# Patient Record
Sex: FEMALE | Race: WHITE | NOT HISPANIC OR LATINO | Employment: UNEMPLOYED | URBAN - METROPOLITAN AREA
[De-identification: names, ages, dates, MRNs, and addresses within clinical notes are randomized per-mention and may not be internally consistent; named-entity substitution may affect disease eponyms.]

---

## 2017-05-30 DIAGNOSIS — Z00.00 ANNUAL PHYSICAL EXAM: Primary | ICD-10-CM

## 2017-08-08 DIAGNOSIS — M79.643 PAIN OF HAND, UNSPECIFIED LATERALITY: Primary | ICD-10-CM

## 2017-08-09 ENCOUNTER — OFFICE VISIT (OUTPATIENT)
Dept: ORTHOPEDICS | Facility: CLINIC | Age: 57
End: 2017-08-09
Payer: COMMERCIAL

## 2017-08-09 ENCOUNTER — HOSPITAL ENCOUNTER (OUTPATIENT)
Dept: RADIOLOGY | Facility: HOSPITAL | Age: 57
Discharge: HOME | End: 2017-08-09
Attending: ORTHOPAEDIC SURGERY
Payer: COMMERCIAL

## 2017-08-09 ENCOUNTER — HOSPITAL ENCOUNTER (OUTPATIENT)
Dept: RADIOLOGY | Facility: HOSPITAL | Age: 57
Discharge: HOME | End: 2017-08-09
Attending: FAMILY MEDICINE
Payer: COMMERCIAL

## 2017-08-09 ENCOUNTER — OFFICE VISIT (OUTPATIENT)
Dept: FAMILY MEDICINE | Facility: CLINIC | Age: 57
End: 2017-08-09
Payer: COMMERCIAL

## 2017-08-09 ENCOUNTER — CLINICAL SUPPORT (OUTPATIENT)
Dept: INTERNAL MEDICINE | Facility: CLINIC | Age: 57
End: 2017-08-09
Payer: COMMERCIAL

## 2017-08-09 VITALS
SYSTOLIC BLOOD PRESSURE: 168 MMHG | HEART RATE: 71 BPM | DIASTOLIC BLOOD PRESSURE: 92 MMHG | WEIGHT: 110 LBS | HEIGHT: 60 IN | BODY MASS INDEX: 21.6 KG/M2

## 2017-08-09 VITALS
TEMPERATURE: 98 F | BODY MASS INDEX: 21.48 KG/M2 | HEART RATE: 76 BPM | DIASTOLIC BLOOD PRESSURE: 80 MMHG | SYSTOLIC BLOOD PRESSURE: 142 MMHG | WEIGHT: 110 LBS

## 2017-08-09 DIAGNOSIS — M65.331 TRIGGER FINGER, RIGHT MIDDLE FINGER: Primary | ICD-10-CM

## 2017-08-09 DIAGNOSIS — Z00.00 PREVENTATIVE HEALTH CARE: Primary | ICD-10-CM

## 2017-08-09 DIAGNOSIS — Z12.11 COLON CANCER SCREENING: ICD-10-CM

## 2017-08-09 DIAGNOSIS — M79.643 PAIN OF HAND, UNSPECIFIED LATERALITY: ICD-10-CM

## 2017-08-09 DIAGNOSIS — Z00.00 ANNUAL PHYSICAL EXAM: ICD-10-CM

## 2017-08-09 DIAGNOSIS — Z12.31 VISIT FOR SCREENING MAMMOGRAM: ICD-10-CM

## 2017-08-09 DIAGNOSIS — Z00.00 ANNUAL PHYSICAL EXAM: Primary | ICD-10-CM

## 2017-08-09 LAB
ALBUMIN SERPL BCP-MCNC: 3.9 G/DL
ALP SERPL-CCNC: 73 U/L
ALT SERPL W/O P-5'-P-CCNC: 12 U/L
ANION GAP SERPL CALC-SCNC: 17 MMOL/L
AST SERPL-CCNC: 16 U/L
BILIRUB SERPL-MCNC: 0.5 MG/DL
BUN SERPL-MCNC: 9 MG/DL
CALCIUM SERPL-MCNC: 9.3 MG/DL
CHLORIDE SERPL-SCNC: 107 MMOL/L
CHOLEST/HDLC SERPL: 3.1 {RATIO}
CO2 SERPL-SCNC: 26 MMOL/L
CREAT SERPL-MCNC: 0.7 MG/DL
ERYTHROCYTE [DISTWIDTH] IN BLOOD BY AUTOMATED COUNT: 12.6 %
EST. GFR  (AFRICAN AMERICAN): >60 ML/MIN/1.73 M^2
EST. GFR  (NON AFRICAN AMERICAN): >60 ML/MIN/1.73 M^2
GLUCOSE SERPL-MCNC: 102 MG/DL
HCT VFR BLD AUTO: 37.6 %
HDL/CHOLESTEROL RATIO: 32.4 %
HDLC SERPL-MCNC: 222 MG/DL
HDLC SERPL-MCNC: 72 MG/DL
HGB BLD-MCNC: 12.9 G/DL
LDLC SERPL CALC-MCNC: 136.4 MG/DL
MCH RBC QN AUTO: 30.4 PG
MCHC RBC AUTO-ENTMCNC: 34.3 G/DL
MCV RBC AUTO: 89 FL
NONHDLC SERPL-MCNC: 150 MG/DL
PLATELET # BLD AUTO: 328 K/UL
PMV BLD AUTO: 10.2 FL
POTASSIUM SERPL-SCNC: 4.2 MMOL/L
PROT SERPL-MCNC: 6.9 G/DL
RBC # BLD AUTO: 4.24 M/UL
SODIUM SERPL-SCNC: 150 MMOL/L
TRIGL SERPL-MCNC: 68 MG/DL
TSH SERPL DL<=0.005 MIU/L-ACNC: 1.7 UIU/ML
WBC # BLD AUTO: 6.16 K/UL

## 2017-08-09 PROCEDURE — 3078F DIAST BP <80 MM HG: CPT | Mod: S$GLB,,, | Performed by: ORTHOPAEDIC SURGERY

## 2017-08-09 PROCEDURE — 85027 COMPLETE CBC AUTOMATED: CPT | Mod: PO

## 2017-08-09 PROCEDURE — 20550 NJX 1 TENDON SHEATH/LIGAMENT: CPT | Mod: F7,S$GLB,, | Performed by: ORTHOPAEDIC SURGERY

## 2017-08-09 PROCEDURE — 80053 COMPREHEN METABOLIC PANEL: CPT | Mod: PO

## 2017-08-09 PROCEDURE — 3077F SYST BP >= 140 MM HG: CPT | Mod: S$GLB,,, | Performed by: ORTHOPAEDIC SURGERY

## 2017-08-09 PROCEDURE — 99213 OFFICE O/P EST LOW 20 MIN: CPT | Mod: 25,S$GLB,, | Performed by: ORTHOPAEDIC SURGERY

## 2017-08-09 PROCEDURE — 3008F BODY MASS INDEX DOCD: CPT | Mod: S$GLB,,, | Performed by: ORTHOPAEDIC SURGERY

## 2017-08-09 PROCEDURE — 80061 LIPID PANEL: CPT

## 2017-08-09 PROCEDURE — 73130 X-RAY EXAM OF HAND: CPT | Mod: TC,PO,RT

## 2017-08-09 PROCEDURE — 83036 HEMOGLOBIN GLYCOSYLATED A1C: CPT

## 2017-08-09 PROCEDURE — 77067 SCR MAMMO BI INCL CAD: CPT | Mod: TC

## 2017-08-09 PROCEDURE — 99396 PREV VISIT EST AGE 40-64: CPT | Mod: S$GLB,,, | Performed by: FAMILY MEDICINE

## 2017-08-09 PROCEDURE — 77063 BREAST TOMOSYNTHESIS BI: CPT | Mod: 26,,, | Performed by: RADIOLOGY

## 2017-08-09 PROCEDURE — 73130 X-RAY EXAM OF HAND: CPT | Mod: 26,RT,, | Performed by: RADIOLOGY

## 2017-08-09 PROCEDURE — 90715 TDAP VACCINE 7 YRS/> IM: CPT | Mod: S$GLB,,, | Performed by: FAMILY MEDICINE

## 2017-08-09 PROCEDURE — 84443 ASSAY THYROID STIM HORMONE: CPT

## 2017-08-09 PROCEDURE — 99999 PR PBB SHADOW E&M-EST. PATIENT-LVL III: CPT | Mod: PBBFAC,,, | Performed by: FAMILY MEDICINE

## 2017-08-09 PROCEDURE — 99999 PR PBB SHADOW E&M-EST. PATIENT-LVL III: CPT | Mod: PBBFAC,,, | Performed by: ORTHOPAEDIC SURGERY

## 2017-08-09 PROCEDURE — 77067 SCR MAMMO BI INCL CAD: CPT | Mod: 26,,, | Performed by: RADIOLOGY

## 2017-08-09 RX ORDER — TRIAMCINOLONE ACETONIDE 40 MG/ML
40 INJECTION, SUSPENSION INTRA-ARTICULAR; INTRAMUSCULAR
Status: DISCONTINUED | OUTPATIENT
Start: 2017-08-09 | End: 2017-08-09 | Stop reason: HOSPADM

## 2017-08-09 RX ADMIN — TRIAMCINOLONE ACETONIDE 40 MG: 40 INJECTION, SUSPENSION INTRA-ARTICULAR; INTRAMUSCULAR at 10:08

## 2017-08-09 NOTE — PROCEDURES
Tendon Sheath  Date/Time: 8/9/2017 10:30 AM  Performed by: TONNY GONZALEZ  Authorized by: TONNY GONZALEZ     Consent Done?: Yes (Verbal)  Timeout: prior to procedure the correct patient, procedure, and site was verified   Indications:  Pain  Site marked: the procedure site was marked    Timeout: prior to procedure the correct patient, procedure, and site was verified    Location:  Long finger  Site:  R long MCP  Prep: patient was prepped and draped in usual sterile fashion    Ultrasonic guidance for needle placement?: No    Needle size:  22 G  Approach:  Volar  Medications:  40 mg triamcinolone acetonide 40 mg/mL  Patient tolerance:  Patient tolerated the procedure well with no immediate complications

## 2017-08-09 NOTE — PROGRESS NOTES
Past Medical History:   Diagnosis Date    Arthritis     Breast cyst 2000    Fibrocystic breast 2000    Hypertension     MVP (mitral valve prolapse)     during pregnancy       Past Surgical History:   Procedure Laterality Date    BREAST CYST ASPIRATION         No current outpatient prescriptions on file.     No current facility-administered medications for this visit.        Allergies   Allergen Reactions    Aspirin Nausea And Vomiting       Family History   Problem Relation Age of Onset    Hypertension Mother     Cancer Father 59     prostate    Hypertension Sister     Hypertension Daughter     Heart disease Paternal Uncle 52     mi    Diabetes Paternal Grandmother     Ovarian cancer Maternal Aunt 80    Breast cancer Maternal Aunt        Social History     Social History    Marital status:      Spouse name: N/A    Number of children: 3    Years of education: N/A     Occupational History    retired teacher      Social History Main Topics    Smoking status: Never Smoker    Smokeless tobacco: Never Used    Alcohol use Not on file      Comment: 2-3 glasses of wine per week    Drug use: Unknown    Sexual activity: Not on file     Other Topics Concern    Not on file     Social History Narrative    Exercise: regular walking       Chief Complaint:   Chief Complaint   Patient presents with    Hand Pain     right hand trigger finger        History: Is a 57-year-old right-hand-dominant female seen for right middle finger pain.  Patient states it started back in December.  Middle finger starts to get stuck in trigger at times.  Never had a history of this before.  Does have some achiness and stiffness in her hand with a history of some arthritis.  Symptoms overall are stable and moderate.  No prior treatment.    Present: The trigger finger started to return.  The cortisone injection back in September helped a lot.  Pain over the last 3-4 weeks.  Has some triggering and is painful after activity.   Pain of a 2 out of 10.  She has had 2 previous injections.      Review of Systems:    Constitution: Negative for chills, fever, and sweats.  Negative for unexplained weight loss.    HENT:  Negative for headaches and blurry vision.    Cardiovascular:Negative for chest pain or irregular heart beat. Negative for hypertension.    Respiratory:  Negative for cough and shortness of breath.    Gastrointestinal: Negative for abdominal pain, heartburn, melena, nausea, and vomitting.    Genitourinary:  Negative bladder incontinence and dysuria.    Musculoskeletal:  See HPI    Neurological: Negative for numbness.    Psychiatric/Behavioral: Negative for depression.  The patient is not nervous/anxious.      Endocrine: Negative for polyuria    Hematologic/Lymphatic: Negative for bleeding problem.  Does not bruise/bleed easily.    Skin: Negative for poor would healing and rash      Physical Examination:    Vital Signs:    Vitals:    08/09/17 0945   BP: (!) 168/92   Pulse: 71       Body mass index is 21.48 kg/m².    This a well-developed, well nourished patient in no acute distress.  They are alert and oriented and cooperative to examination.  Pt. walks without an antalgic gait.      Examination of the right hand and wrist shows no signs of rashes or erythema. Patient has no masses ecchymosis or effusions. Patient has full range of motion of the wrist in flexion and extension as well as ulnar and radial deviation. The patient also has full range of motion of all joints in the hand. There are 2+ radial pulse and intact light touch sensation in all 5 digits.  Tender over the A1 pulley of the long finger    X-rays: 3 views of the right hand are ordered and reviewed which show no significant arthritic changes or fractures.  No change from previous.     Assessment:: Right long trigger finger    Plan:  I reviewed the diagnosis of trigger finger with the patient today.  We discussed treatment options including observation versus cortisone  injection.  Patient would like to try cortisone injection today.  We also talked about trigger finger surgery.  She'll follow-up as needed.

## 2017-08-09 NOTE — PROGRESS NOTES
August 9, 2017                                                                                                                                                                                                                                                                                      Magui Lemus  [address] [facility] [place] [address]                                                                                                                                                                                                                                                                                                RE: Magui Lemus                                                        Clinic #:[id number]                                                                                                                                   Dear  Magui Lemus,                                                                                                                                           Thank you for allowing me to serve you and perform your Executive Health exam on August 9, 2017.   This letter will serve a brief summary of the history, physical findings, and laboratory/studies performed and recommendations at that time.                                                                                         REASON FOR VISIT: Executive Health Preventive Physical Examination    Past Medical History:   Diagnosis Date    Arthritis     Hypertension     MVP (mitral valve prolapse)     during pregnancy       History reviewed. No pertinent surgical history.    Family History   Problem Relation Age of Onset    Hypertension Mother     Cancer Father 59     prostate    Hypertension Sister     Hypertension Daughter     Heart disease Paternal Uncle 52     mi    Diabetes Paternal Grandmother     Ovarian cancer Maternal Aunt 80    Breast cancer Neg Hx        Social History     Social History    Marital  status:      Social History Main Topics    Smoking status: Never Smoker    Smokeless tobacco: Not on file    Alcohol use Not on file      Comment: 2-3 glasses of wine per week     Allergies: Aspirin      REVIEW OF SYSTEMS:  No recent changes in weight, or complaints of fatigue. No recent changes in vision, or hearing. Denies frequent headaches.No recent changes in voice. No new or changing skin lesions. Denies abnormal bruising or bleeding.  Denies chest pain or sensation of skipped beats. No new onset of shortness of breath, or dyspnea on exertion. Denies abdominal discomfort, constipation, diarrhea,or blood in stool. Denies difficulty with urination.   No recent joint swelling or muscle discomfort. Denies pain or weakness in extremeties. No recent loss of balance. Denies problems with sleep or depression.        Remainder of the review of systems without pertinent positves at this time.                                                                              PHYSICAL EXAM:   VITAL SIGNS: BP (!) 142/80   Pulse 76   Temp 98 °F (36.7 °C) (Oral)   Wt 49.9 kg (110 lb)   BMI 21.48 kg/m²   GENERAL APPEARANCE:  Well nourished and normally developed,  pleasant 57 y.o. female, in good spirits.  SKIN: Without rashes or overt lesions.  HEENT: Head normacephalic. There was no scleral icterus. Mucous membranes were moist. Dentition. Neck is supple, no thyromegally, or carotid bruits.  LUNGS: Clear to auscultation bilaterally. Normal respiratory effort.  HEART: Exam reveals regular rate and rhythm. First and second heart sounds normal. No murmurs, rubs or gallops.   ABDOMEN: Soft, non-tender, non-distended. Exam reveals normal bowl sounds, no masses, no organomegaly and no aortic enlargement.    EXTREMITIES:  Nonedematous, both femoral and pedal pulses are normal. No joint stiffness or tenderness. Full range of motion and strength, upper and lower bilaterally.    LAB DATA/STUDIES REVIEWED:  LABS:  reviewed  Mammogram: normal    ASSESSMENT/RECOMMENDATIONS :    At this time,  you appear to be in good medical condition.    tdap vaccine  colonoscopy  Continue to work on regular exercise, maintenance of a healthy weight, balanced diet rich in fruits/vegetables and lean protein, and avoidance of unhealthy habits like smoking and excessive alcohol intake.  I look forward to seeing you again next year.    Please contact me should you have any questions or concerns regarding physical findings, or my recommendations.       Sincerely yours,    Geronimo Gee M.D.  Department of Family Practice  [facility]

## 2017-08-10 LAB
ESTIMATED AVG GLUCOSE: 97 MG/DL
HBA1C MFR BLD HPLC: 5 %

## 2017-08-11 ENCOUNTER — LAB VISIT (OUTPATIENT)
Dept: LAB | Facility: HOSPITAL | Age: 57
End: 2017-08-11
Attending: FAMILY MEDICINE
Payer: COMMERCIAL

## 2017-08-11 DIAGNOSIS — Z11.59 NEED FOR HEPATITIS C SCREENING TEST: ICD-10-CM

## 2017-08-11 LAB — HCV AB SERPL QL IA: NEGATIVE

## 2017-08-11 PROCEDURE — 36415 COLL VENOUS BLD VENIPUNCTURE: CPT | Mod: PO

## 2017-08-11 PROCEDURE — 86803 HEPATITIS C AB TEST: CPT

## 2017-08-17 ENCOUNTER — TELEPHONE (OUTPATIENT)
Dept: GASTROENTEROLOGY | Facility: CLINIC | Age: 57
End: 2017-08-17

## 2017-08-17 NOTE — TELEPHONE ENCOUNTER
----- Message from Brenda Kelsey sent at 8/17/2017 10:43 AM CDT -----  Contact: self   Patient miss call from your office please call back at [phone] (mbdf)

## 2017-08-17 NOTE — TELEPHONE ENCOUNTER
Pt has been scheduled for colon on 9/5. Reviewed mg citrate prep. Pt is aware I will be mailing instructions and confirmation letter.

## 2017-09-01 ENCOUNTER — TELEPHONE (OUTPATIENT)
Dept: GASTROENTEROLOGY | Facility: CLINIC | Age: 57
End: 2017-09-01

## 2017-09-01 NOTE — TELEPHONE ENCOUNTER
----- Message from Brenda Kelsey sent at 9/1/2017  9:44 AM CDT -----  Contact: Self  Patient want to cancel colonoscopy procedure for September 5. Will call back to reschedule any questions call back at [phone] (home)

## 2017-09-13 ENCOUNTER — TELEPHONE (OUTPATIENT)
Dept: GASTROENTEROLOGY | Facility: CLINIC | Age: 57
End: 2017-09-13

## 2017-09-13 NOTE — TELEPHONE ENCOUNTER
----- Message from Jenna Nguyen sent at 9/13/2017  9:48 AM CDT -----  Please call pt at [phone]  Asking to reschedule her colonoscopy

## 2017-09-15 ENCOUNTER — ANESTHESIA EVENT (OUTPATIENT)
Dept: ENDOSCOPY | Facility: HOSPITAL | Age: 57
End: 2017-09-15
Payer: COMMERCIAL

## 2017-09-15 ENCOUNTER — ANESTHESIA (OUTPATIENT)
Dept: ENDOSCOPY | Facility: HOSPITAL | Age: 57
End: 2017-09-15
Payer: COMMERCIAL

## 2017-09-15 ENCOUNTER — HOSPITAL ENCOUNTER (OUTPATIENT)
Facility: HOSPITAL | Age: 57
Discharge: HOME | End: 2017-09-15
Attending: INTERNAL MEDICINE | Admitting: INTERNAL MEDICINE
Payer: COMMERCIAL

## 2017-09-15 ENCOUNTER — SURGERY (OUTPATIENT)
Age: 57
End: 2017-09-15

## 2017-09-15 VITALS
HEART RATE: 87 BPM | BODY MASS INDEX: 20.77 KG/M2 | DIASTOLIC BLOOD PRESSURE: 80 MMHG | OXYGEN SATURATION: 100 % | SYSTOLIC BLOOD PRESSURE: 154 MMHG | TEMPERATURE: 98 F | HEIGHT: 61 IN | RESPIRATION RATE: 16 BRPM | WEIGHT: 110 LBS

## 2017-09-15 VITALS — RESPIRATION RATE: 16 BRPM

## 2017-09-15 DIAGNOSIS — Z12.11 COLON CANCER SCREENING: ICD-10-CM

## 2017-09-15 PROCEDURE — 45381 COLONOSCOPY SUBMUCOUS NJX: CPT | Mod: 51,,, | Performed by: INTERNAL MEDICINE

## 2017-09-15 PROCEDURE — 45381 COLONOSCOPY SUBMUCOUS NJX: CPT | Mod: PO | Performed by: INTERNAL MEDICINE

## 2017-09-15 PROCEDURE — 88305 TISSUE EXAM BY PATHOLOGIST: CPT | Mod: 26,,, | Performed by: PATHOLOGY

## 2017-09-15 PROCEDURE — 37000009 HC ANESTHESIA EA ADD 15 MINS: Mod: PO | Performed by: INTERNAL MEDICINE

## 2017-09-15 PROCEDURE — D9220A PRA ANESTHESIA: Mod: CRNA,,,

## 2017-09-15 PROCEDURE — 63600175 PHARM REV CODE 636 W HCPCS: Mod: PO | Performed by: NURSE ANESTHETIST, CERTIFIED REGISTERED

## 2017-09-15 PROCEDURE — 45385 COLONOSCOPY W/LESION REMOVAL: CPT | Mod: PO | Performed by: INTERNAL MEDICINE

## 2017-09-15 PROCEDURE — 37000008 HC ANESTHESIA 1ST 15 MINUTES: Mod: PO | Performed by: INTERNAL MEDICINE

## 2017-09-15 PROCEDURE — 25000003 PHARM REV CODE 250: Mod: PO | Performed by: ANESTHESIOLOGY

## 2017-09-15 PROCEDURE — 27201089 HC SNARE, DISP (ANY): Mod: PO | Performed by: INTERNAL MEDICINE

## 2017-09-15 PROCEDURE — 88305 TISSUE EXAM BY PATHOLOGIST: CPT | Performed by: PATHOLOGY

## 2017-09-15 PROCEDURE — 25000003 PHARM REV CODE 250: Mod: PO | Performed by: INTERNAL MEDICINE

## 2017-09-15 PROCEDURE — 45385 COLONOSCOPY W/LESION REMOVAL: CPT | Mod: 33,,, | Performed by: INTERNAL MEDICINE

## 2017-09-15 PROCEDURE — D9220A PRA ANESTHESIA: Mod: ANES,,, | Performed by: ANESTHESIOLOGY

## 2017-09-15 PROCEDURE — 27201028 HC NEEDLE, SCLERO: Mod: PO | Performed by: INTERNAL MEDICINE

## 2017-09-15 RX ORDER — SODIUM CHLORIDE, SODIUM LACTATE, POTASSIUM CHLORIDE, CALCIUM CHLORIDE 600; 310; 30; 20 MG/100ML; MG/100ML; MG/100ML; MG/100ML
INJECTION, SOLUTION INTRAVENOUS CONTINUOUS
Status: DISCONTINUED | OUTPATIENT
Start: 2017-09-15 | End: 2017-09-15 | Stop reason: HOSPADM

## 2017-09-15 RX ORDER — LIDOCAINE HCL/PF 100 MG/5ML
SYRINGE (ML) INTRAVENOUS
Status: DISCONTINUED | OUTPATIENT
Start: 2017-09-15 | End: 2017-09-15

## 2017-09-15 RX ORDER — PROPOFOL 10 MG/ML
VIAL (ML) INTRAVENOUS
Status: DISCONTINUED | OUTPATIENT
Start: 2017-09-15 | End: 2017-09-15

## 2017-09-15 RX ORDER — LIDOCAINE HYDROCHLORIDE 10 MG/ML
1 INJECTION INFILTRATION; PERINEURAL ONCE
Status: COMPLETED | OUTPATIENT
Start: 2017-09-15 | End: 2017-09-15

## 2017-09-15 RX ADMIN — PROPOFOL 30 MG: 10 INJECTION, EMULSION INTRAVENOUS at 10:09

## 2017-09-15 RX ADMIN — PROPOFOL 50 MG: 10 INJECTION, EMULSION INTRAVENOUS at 10:09

## 2017-09-15 RX ADMIN — SODIUM CHLORIDE, SODIUM LACTATE, POTASSIUM CHLORIDE, AND CALCIUM CHLORIDE: 600; 310; 30; 20 INJECTION, SOLUTION INTRAVENOUS at 09:09

## 2017-09-15 RX ADMIN — PROPOFOL 100 MG: 10 INJECTION, EMULSION INTRAVENOUS at 10:09

## 2017-09-15 RX ADMIN — PROPOFOL 40 MG: 10 INJECTION, EMULSION INTRAVENOUS at 10:09

## 2017-09-15 RX ADMIN — LIDOCAINE HYDROCHLORIDE 1 ML: 10 INJECTION, SOLUTION EPIDURAL; INFILTRATION; INTRACAUDAL; PERINEURAL at 09:09

## 2017-09-15 RX ADMIN — LIDOCAINE HYDROCHLORIDE 75 MG: 20 INJECTION, SOLUTION INTRAVENOUS at 10:09

## 2017-09-15 NOTE — DISCHARGE SUMMARY
Discharge Note  Short Stay      SUMMARY     Admit Date: 9/15/2017    Attending Physician: Florentino Lawton MD     Discharge Physician: Florentino Lawton MD    Discharge Date: 9/15/2017 10:59 AM    Final Diagnosis: Screen for colon cancer [Z12.11]    Disposition: HOME OR SELF CARE    Patient Instructions:   There are no discharge medications for this patient.      Discharge Procedure Orders (must include Diet, Follow-up, Activity)    Follow Up:  Follow up with PCP as previously scheduled  Resume routine diet.  Activity as tolerated.    No driving day of procedure.

## 2017-09-15 NOTE — ANESTHESIA PREPROCEDURE EVALUATION
09/15/2017  Magui Lemus is a 57 y.o., female.    Anesthesia Evaluation    I have reviewed the Patient Summary Reports.    I have reviewed the Nursing Notes.   I have reviewed the Medications.     Review of Systems         Anesthesia Plan  Type of Anesthesia, risks & benefits discussed:  Anesthesia Type:  general  Patient's Preference:   Intra-op Monitoring Plan:   Intra-op Monitoring Plan Comments:   Post Op Pain Control Plan:   Post Op Pain Control Plan Comments:   Induction:   IV  Beta Blocker:  Patient is not currently on a Beta-Blocker (No further documentation required).       Informed Consent: Patient understands risks and agrees with Anesthesia plan.  Questions answered. Anesthesia consent signed with patient.  ASA Score: 1     Day of Surgery Review of History & Physical: I have interviewed and examined the patient. I have reviewed the patient's H&P dated:  There are no significant changes.          Ready For Surgery From Anesthesia Perspective.

## 2017-09-15 NOTE — TRANSFER OF CARE
"Anesthesia Transfer of Care Note    Patient: Magui Lemus    Procedure(s) Performed: Procedure(s) (LRB):  COLONOSCOPY (N/A)    Patient location: PACU    Anesthesia Type: general    Transport from OR: Transported from OR on room air with adequate spontaneous ventilation    Post pain: adequate analgesia    Post assessment: no apparent anesthetic complications and tolerated procedure well    Post vital signs: stable    Level of consciousness: awake    Nausea/Vomiting: no nausea/vomiting    Complications: none    Transfer of care protocol was followed      Last vitals:   Visit Vitals  BP (!) 161/91 (BP Location: Right arm, Patient Position: Lying)   Pulse 97   Temp 36.4 °C (97.5 °F) (Skin)   Resp 18   Ht 5' 0.5" (1.537 m)   Wt 49.9 kg (110 lb)   SpO2 99%   Breastfeeding? No   BMI 21.13 kg/m²     "

## 2017-09-15 NOTE — H&P
History & Physical - Short Stay  Gastroenterology      SUBJECTIVE:     Procedure: Colonoscopy    Chief Complaint/Indication for Procedure: Screening    No prescriptions prior to admission.       Review of patient's allergies indicates:   Allergen Reactions    Aspirin Nausea And Vomiting        Past Medical History:   Diagnosis Date    Arthritis     Breast cyst 2000    Fibrocystic breast 2000    Hypertension     resolved    MVP (mitral valve prolapse)     during pregnancy     Past Surgical History:   Procedure Laterality Date    BREAST CYST ASPIRATION Left      Family History   Problem Relation Age of Onset    Hypertension Mother     Cancer Father 59     prostate    Hypertension Sister     Hypertension Daughter     Heart disease Paternal Uncle 52     mi    Diabetes Paternal Grandmother     Ovarian cancer Maternal Aunt 80    Breast cancer Maternal Aunt      Social History   Substance Use Topics    Smoking status: Never Smoker    Smokeless tobacco: Never Used    Alcohol use Yes      Comment: 2-3 glasses of wine per week         OBJECTIVE:     Vital Signs (Most Recent)  Temp: 97.5 °F (36.4 °C) (09/15/17 0949)  Pulse: 97 (09/15/17 0949)  Resp: 18 (09/15/17 0949)  BP: (!) 161/91 (09/15/17 0949)  SpO2: 99 % (09/15/17 0949)    Physical Exam                                                        GENERAL:  Comfortable, in no acute distress.                                 HEENT EXAM:  Nonicteric.  No adenopathy.  Oropharynx is clear.               NECK:  Supple.                                                               LUNGS:  Clear.                                                               CARDIAC:  Regular rate and rhythm.  S1, S2.  No murmur.                      ABDOMEN:  Soft, positive bowel sounds, nontender.  No hepatosplenomegaly or masses.  No rebound or guarding.                                             EXTREMITIES:  No edema.     MENTAL STATUS:  Normal, alert and  oriented.      ASSESSMENT/PLAN:     Assessment: Colorectal cancer screening    Plan: Colonoscopy    Anesthesia Plan: General    ASA Grade: ASA 2 - Patient with mild systemic disease with no functional limitations    MALLAMPATI SCORE:  I (soft palate, uvula, fauces, and tonsillar pillars visible)

## 2017-09-15 NOTE — DISCHARGE INSTRUCTIONS
Recovery After Procedural Sedation (Adult)  You have been given medicine by vein to make you sleep during your surgery. This may have included both a pain medicine and sleeping medicine. Most of the effects have worn off. But you may still have some drowsiness for the next 6 to 8 hours.  Home care  Follow these guidelines when you get home:  · For the next 8 hours, you should be watched by a responsible adult. This person should make sure your condition is not getting worse.  · Don't drink any alcohol for the next 24 hours.  · Don't drive, operate dangerous machinery, or make important business or personal decisions during the next 24 hours.  Note: Your healthcare provider may tell you not to take any medicine by mouth for pain or sleep in the next 4 hours. These medicines may react with the medicines you were given in the hospital. This could cause a much stronger response than usual.  Follow-up care  Follow up with your healthcare provider if you are not alert and back to your usual level of activity within 12 hours.  When to seek medical advice  Call your healthcare provider right away if any of these occur:  · Drowsiness gets worse  · Weakness or dizziness gets worse  · Repeated vomiting  · You can't be awakened   Date Last Reviewed: 10/18/2016  © 3423-5299 [employer]. [address] [place] [address], [place] [address]. All rights reserved. This information is not intended as a substitute for professional medical care. Always follow your healthcare professional's instructions.

## 2017-09-15 NOTE — ANESTHESIA POSTPROCEDURE EVALUATION
"Anesthesia Post Evaluation    Patient: Magui Lemus    Procedure(s) Performed: Procedure(s) (LRB):  COLONOSCOPY (N/A)    Final Anesthesia Type: general  Patient location during evaluation: PACU  Patient participation: Yes- Able to Participate  Level of consciousness: awake and alert and oriented  Post-procedure vital signs: reviewed and stable  Pain management: adequate  Airway patency: patent  PONV status at discharge: No PONV  Anesthetic complications: no      Cardiovascular status: blood pressure returned to baseline  Respiratory status: unassisted, spontaneous ventilation and room air  Hydration status: euvolemic  Follow-up not needed.        Visit Vitals  BP (!) 154/80 (BP Location: Left arm)   Pulse 87   Temp 36.4 °C (97.5 °F)   Resp 16   Ht 5' 0.5" (1.537 m)   Wt 49.9 kg (110 lb)   SpO2 100%   Breastfeeding? No   BMI 21.13 kg/m²       Pain/Chani Score: Pain Assessment Performed: Yes (9/15/2017 11:07 AM)  Presence of Pain: denies (9/15/2017 11:07 AM)  Chani Score: 10 (9/15/2017 11:07 AM)      "

## 2018-07-12 DIAGNOSIS — Z00.00 ANNUAL PHYSICAL EXAM: Primary | ICD-10-CM

## 2018-08-08 ENCOUNTER — TELEPHONE (OUTPATIENT)
Dept: RADIOLOGY | Facility: HOSPITAL | Age: 58
End: 2018-08-08

## 2020-01-08 ENCOUNTER — TELEPHONE (OUTPATIENT)
Dept: RADIOLOGY | Facility: HOSPITAL | Age: 60
End: 2020-01-08